# Patient Record
Sex: MALE | Race: BLACK OR AFRICAN AMERICAN | NOT HISPANIC OR LATINO | Employment: FULL TIME | URBAN - METROPOLITAN AREA
[De-identification: names, ages, dates, MRNs, and addresses within clinical notes are randomized per-mention and may not be internally consistent; named-entity substitution may affect disease eponyms.]

---

## 2017-05-23 ENCOUNTER — HOSPITAL ENCOUNTER (EMERGENCY)
Facility: HOSPITAL | Age: 42
Discharge: HOME | End: 2017-05-23
Attending: EMERGENCY MEDICINE
Payer: COMMERCIAL

## 2017-05-23 VITALS
TEMPERATURE: 98 F | HEIGHT: 74 IN | WEIGHT: 170 LBS | HEART RATE: 62 BPM | OXYGEN SATURATION: 98 % | DIASTOLIC BLOOD PRESSURE: 92 MMHG | SYSTOLIC BLOOD PRESSURE: 154 MMHG | RESPIRATION RATE: 18 BRPM | BODY MASS INDEX: 21.82 KG/M2

## 2017-05-23 DIAGNOSIS — S23.9XXA THORACIC BACK SPRAIN, INITIAL ENCOUNTER: Primary | ICD-10-CM

## 2017-05-23 PROCEDURE — 99283 EMERGENCY DEPT VISIT LOW MDM: CPT

## 2017-05-23 RX ORDER — HYDROCODONE BITARTRATE AND ACETAMINOPHEN 7.5; 325 MG/1; MG/1
1 TABLET ORAL EVERY 6 HOURS PRN
Qty: 20 TABLET | Refills: 0 | Status: SHIPPED | OUTPATIENT
Start: 2017-05-23

## 2017-05-23 NOTE — ED PROVIDER NOTES
Encounter Date: 5/23/2017       History     Chief Complaint   Patient presents with    Back Pain     mid back pain that began tonight ( a few hours ago) while at work. he was lifting something heavy. no medications pta. rates pain 8/10. ambulatory     Review of patient's allergies indicates:   Allergen Reactions    Penicillins Hives     The history is provided by the patient.   Back Pain    This is a recurrent problem. The current episode started 3 to 5 hours ago. The problem occurs constantly. The problem has been unchanged. The pain is associated with lifting heavy objects. The pain is present in the thoracic spine. The quality of the pain is described as aching. The pain does not radiate. The pain is at a severity of 8/10. The symptoms are aggravated by certain positions. The pain is worse during the night. Pertinent negatives include no chest pain, no fever, no numbness, no weight loss, no abdominal swelling, no bowel incontinence, no perianal numbness, no bladder incontinence, no dysuria and no pelvic pain. He has tried nothing for the symptoms. Risk factors include lack of exercise, poor posture and a sedentary lifestyle.     Past Medical History:   Diagnosis Date    History of stomach ulcers     Osteomyelitis      Past Surgical History:   Procedure Laterality Date    Right leg surgery due to osteomylitis- 9 sx       History reviewed. No pertinent family history.  Social History   Substance Use Topics    Smoking status: Current Every Day Smoker     Packs/day: 1.00     Types: Cigarettes     Start date: 8/4/1989    Smokeless tobacco: Not on file    Alcohol use Yes      Comment: occasionally     Review of Systems   Constitutional: Negative for fever and weight loss.   Cardiovascular: Negative for chest pain.   Gastrointestinal: Negative for bowel incontinence.   Genitourinary: Negative for bladder incontinence, dysuria and pelvic pain.   Musculoskeletal: Positive for back pain.   Neurological: Negative  for numbness.   All other systems reviewed and are negative.      Physical Exam     Initial Vitals [05/23/17 0343]   BP Pulse Resp Temp SpO2   (!) 154/92 62 18 98.2 °F (36.8 °C) 98 %     Physical Exam    Nursing note and vitals reviewed.  Constitutional: He appears well-developed and well-nourished.   HENT:   Head: Normocephalic and atraumatic.   Eyes: EOM are normal.   Neck: Normal range of motion. Neck supple.   Cardiovascular: Normal rate, regular rhythm, normal heart sounds and intact distal pulses.   Pulmonary/Chest: Breath sounds normal.   Abdominal: Soft.   Musculoskeletal: Normal range of motion.   Neurological: He is alert and oriented to person, place, and time.   Skin: Skin is warm and dry.   Psychiatric: He has a normal mood and affect. His behavior is normal. Judgment and thought content normal.         ED Course   Procedures  Labs Reviewed - No data to display                            ED Course     Clinical Impression:   The encounter diagnosis was Thoracic back sprain, initial encounter.    Disposition:   Disposition: Discharged  Condition: Stable       Vida Craig MD  05/23/17 0400

## 2017-10-18 ENCOUNTER — CLINICAL SUPPORT (OUTPATIENT)
Dept: OCCUPATIONAL MEDICINE | Facility: CLINIC | Age: 42
End: 2017-10-18

## 2017-10-18 DIAGNOSIS — Z00.00 PHYSICAL EXAM: Primary | ICD-10-CM

## 2017-10-18 LAB
BILIRUB UR QL STRIP: NORMAL
GLUCOSE UR QL STRIP: NORMAL
KETONES UR QL STRIP: NORMAL
LEUKOCYTE ESTERASE UR QL STRIP: NORMAL
PH, POC UA: NORMAL (ref 5–8)
POC BLOOD, URINE: NORMAL
POC NITRATES, URINE: NORMAL
PROT UR QL STRIP: NORMAL
SP GR UR STRIP: NORMAL (ref 1–1.03)
UROBILINOGEN UR STRIP-ACNC: NORMAL (ref 0.3–2.2)

## 2017-10-18 PROCEDURE — 80305 DRUG TEST PRSMV DIR OPT OBS: CPT | Mod: S$GLB,,, | Performed by: NURSE PRACTITIONER

## 2017-10-18 PROCEDURE — 99499 UNLISTED E&M SERVICE: CPT | Mod: S$GLB,,, | Performed by: NURSE PRACTITIONER
